# Patient Record
Sex: MALE | Race: WHITE | NOT HISPANIC OR LATINO | Employment: FULL TIME | URBAN - METROPOLITAN AREA
[De-identification: names, ages, dates, MRNs, and addresses within clinical notes are randomized per-mention and may not be internally consistent; named-entity substitution may affect disease eponyms.]

---

## 2023-05-10 ENCOUNTER — TELEPHONE (OUTPATIENT)
Dept: PEDIATRICS | Facility: CLINIC | Age: 23
End: 2023-05-10
Payer: COMMERCIAL

## 2023-05-10 DIAGNOSIS — F90.2 ADHD (ATTENTION DEFICIT HYPERACTIVITY DISORDER), COMBINED TYPE: Primary | ICD-10-CM

## 2023-05-10 PROBLEM — R09.81 CHRONIC NASAL CONGESTION: Status: ACTIVE | Noted: 2023-05-10

## 2023-05-10 RX ORDER — FLUTICASONE PROPIONATE 50 MCG
2 SPRAY, SUSPENSION (ML) NASAL DAILY
COMMUNITY
Start: 2019-04-19

## 2023-05-10 RX ORDER — LISDEXAMFETAMINE DIMESYLATE 50 MG/1
50 CAPSULE ORAL DAILY
COMMUNITY
End: 2023-05-10 | Stop reason: SDUPTHER

## 2023-05-10 RX ORDER — LISDEXAMFETAMINE DIMESYLATE 50 MG/1
50 CAPSULE ORAL DAILY
Qty: 30 CAPSULE | Refills: 0 | Status: SHIPPED | OUTPATIENT
Start: 2023-05-10 | End: 2023-09-01 | Stop reason: SDUPTHER

## 2023-05-10 NOTE — TELEPHONE ENCOUNTER
Mom called asking for a refill on his Vyvanse 50 MG to be sent to [facility].  Last [facility] 10.5.22  Mom's # [phone]

## 2023-07-17 ENCOUNTER — OFFICE VISIT (OUTPATIENT)
Dept: PEDIATRICS | Facility: CLINIC | Age: 23
End: 2023-07-17
Payer: COMMERCIAL

## 2023-07-17 VITALS
WEIGHT: 171 LBS | HEART RATE: 73 BPM | DIASTOLIC BLOOD PRESSURE: 78 MMHG | SYSTOLIC BLOOD PRESSURE: 122 MMHG | BODY MASS INDEX: 26.78 KG/M2

## 2023-07-17 DIAGNOSIS — N50.89 SCROTAL MASS: Primary | ICD-10-CM

## 2023-07-17 PROCEDURE — 99213 OFFICE O/P EST LOW 20 MIN: CPT | Performed by: PEDIATRICS

## 2023-07-17 RX ORDER — CEPHALEXIN 500 MG/1
CAPSULE ORAL
Qty: 20 CAPSULE | Refills: 0 | Status: SHIPPED | OUTPATIENT
Start: 2023-07-17

## 2023-07-17 NOTE — PROGRESS NOTES
Danny Reynoso is a 23 y.o. male who presents with   Chief Complaint   Patient presents with    Mass     Lump on testicle, first noticed 2 days ago   .   He is here today with  self.    HPI  Found a painless lump in the scrotum- maybe on the teste   Needs checked   Objective   /78   Pulse 73   Wt 77.6 kg (171 lb)   BMI 26.78 kg/m²     Physical Exam  Nad  No mass on the testicle, but the vas deferens has a firm bulge and there is an adjacent mass 1.5cm firm and mobile mass in scrotum, possible inguinal where scrotum attaches to wall. Slightly tender, not erythematous     Assessment/Plan   Problem List Items Addressed This Visit    None    Healthy young adult with a Left scrotal mass  Check US to assess  ? Adjacent lymphadenopathy- start keflex 500mg twice a day x 5-10 days  Will call with results  Reassured

## 2023-07-17 NOTE — PATIENT INSTRUCTIONS
Healthy young adult with a Left scrotal mass  Check US to assess  ? Adjacent lymphadenopathy- start keflex 500mg twice a day x 5-10 days  Will call with results  Reassured

## 2023-07-18 ENCOUNTER — TELEPHONE (OUTPATIENT)
Dept: PEDIATRICS | Facility: CLINIC | Age: 23
End: 2023-07-18
Payer: COMMERCIAL

## 2023-09-01 ENCOUNTER — TELEPHONE (OUTPATIENT)
Dept: PEDIATRICS | Facility: CLINIC | Age: 23
End: 2023-09-01
Payer: COMMERCIAL

## 2023-09-01 DIAGNOSIS — F90.2 ADHD (ATTENTION DEFICIT HYPERACTIVITY DISORDER), COMBINED TYPE: ICD-10-CM

## 2023-09-01 RX ORDER — LISDEXAMFETAMINE DIMESYLATE 50 MG/1
50 CAPSULE ORAL DAILY
Qty: 30 CAPSULE | Refills: 0 | Status: SHIPPED | OUTPATIENT
Start: 2023-09-01 | End: 2023-10-01

## 2023-09-01 NOTE — TELEPHONE ENCOUNTER
Mom called and Danny needs a refill on the Vyvanse 50mg to the [facility] on [address]  on file. Thank you!

## 2024-10-17 ENCOUNTER — TELEPHONE (OUTPATIENT)
Dept: PEDIATRICS | Facility: CLINIC | Age: 24
End: 2024-10-17
Payer: COMMERCIAL

## 2024-10-17 DIAGNOSIS — F90.2 ADHD (ATTENTION DEFICIT HYPERACTIVITY DISORDER), COMBINED TYPE: Primary | ICD-10-CM

## 2024-10-17 NOTE — TELEPHONE ENCOUNTER
Requesting a refill-lisdexamfetamine (Vyvanse) 50 mg capsule to the pharmacy on file. I noticed that patient hasn't had a WCC and he is 24. Let me know how you would like to proceed. Thanks!

## 2024-10-17 NOTE — TELEPHONE ENCOUNTER
I notified mom. That would be great if you could give a  psych referral. I'll look for the email list as well! Thanks!

## 2024-11-25 ENCOUNTER — TELEPHONE (OUTPATIENT)
Dept: PEDIATRICS | Facility: CLINIC | Age: 24
End: 2024-11-25
Payer: COMMERCIAL

## 2024-11-25 NOTE — TELEPHONE ENCOUNTER
Patient , Danny, is aware you are out of the office today:   He is requesting a copy or any information regarding the Psych Dr who you referred him to that diagnosed him with ADHD in 2019. He called the medical records company [employer] and they said that the Dr he saw sent you electronic records  regarding this diagnosis he also called  medical records dept and they couldn't help him . He understands per the HIPAA regulations you can not give him any literal records of this but if you know of the Doctors name (he doesn't remember) that would be helpful. I have tried to help him and ended up directing him to [facility] as well as sending you this message. Please let me know if you have any suggestions I can give him.     Thank you